# Patient Record
(demographics unavailable — no encounter records)

---

## 2024-10-23 NOTE — HISTORY OF PRESENT ILLNESS
[FreeTextEntry1] : SUZETTE MORILLO  is being evaluated at the request of Dr. Veloz for an opinion re: intermittent  globus type sensation for the past year. Styates that has sensation of food getting stuck in oropharynx @ 1-2 times months. Sandwiches seem to be the most problematic.  Addtionally give s h/o constipation on Wegovy and a h/o diverticulitis ~  6 years ago. Has never had a colonoscopy  Denies melena, hematemersis, BRBPR, unexpected weight loss

## 2024-10-23 NOTE — CONSULT LETTER
[Dear  ___] : Dear  [unfilled], [Consult Letter:] : I had the pleasure of evaluating your patient, [unfilled]. [Please see my note below.] : Please see my note below. [Consult Closing:] : Thank you very much for allowing me to participate in the care of this patient.  If you have any questions, please do not hesitate to contact me. [Sincerely,] : Sincerely, [FreeTextEntry3] : Sotero Mathew MD tel: [phone] fax: [phone]

## 2024-10-23 NOTE — HISTORY OF PRESENT ILLNESS
[FreeTextEntry1] : SUZETTE MORILLO  is being evaluated at the request of Dr. Vleoz for an opinion re: intermittent  globus type sensation for the past year. Styates that has sensation of food getting stuck in oropharynx @ 1-2 times months. Sandwiches seem to be the most problematic.  Addtionally give s h/o constipation on Wegovy and a h/o diverticulitis ~  6 years ago. Has never had a colonoscopy  Denies melena, hematemersis, BRBPR, unexpected weight loss

## 2024-10-23 NOTE — CONSULT LETTER
[Dear  ___] : Dear  [unfilled], [Consult Letter:] : I had the pleasure of evaluating your patient, [unfilled]. [Please see my note below.] : Please see my note below. [Consult Closing:] : Thank you very much for allowing me to participate in the care of this patient.  If you have any questions, please do not hesitate to contact me. [Sincerely,] : Sincerely, [FreeTextEntry3] : Sotero Mathew MD tel: [phone] fax: [phone]

## 2024-10-23 NOTE — ASSESSMENT
[FreeTextEntry1] : 1. ? LPRT:  Trial Pantoprazole.  Dietary and lifestyle modiiations. EGD planned  2. Constipation on Wegovy..increase water / prn Miralax  3. History odf Diverticulitis:  Colonoscopy planned  Pertinent available records reviewed Risks of the procedures including but not limited to bleeding / perforation / infection / anesthesia complication / missed  lesions explained to the  patient . The patient expressed understanding and a desire to proceed with the procedures.  Risk of not doing procedures includes but is not limited to missed or delayed diagnosis of gastric pathology, colon cancer or other gastrointestinal pathology  A consultation note was provided to the referring provider

## 2024-10-23 NOTE — PHYSICAL EXAM
[Alert] : alert [Sclera] : the sclera and conjunctiva were normal [Normal Appearance] : the appearance of the neck was normal [None] : no edema [Abdomen Soft] : soft [No CVA Tenderness] : no CVA  tenderness [Abnormal Walk] : normal gait [Normal Color / Pigmentation] : normal skin color and pigmentation [No Focal Deficits] : no focal deficits [Oriented To Time, Place, And Person] : oriented to person, place, and time [de-identified] : Deferred pending colonoscopy

## 2024-10-23 NOTE — PHYSICAL EXAM
[Alert] : alert [Sclera] : the sclera and conjunctiva were normal [Normal Appearance] : the appearance of the neck was normal [None] : no edema [Abdomen Soft] : soft [No CVA Tenderness] : no CVA  tenderness [Abnormal Walk] : normal gait [Normal Color / Pigmentation] : normal skin color and pigmentation [No Focal Deficits] : no focal deficits [Oriented To Time, Place, And Person] : oriented to person, place, and time [de-identified] : Deferred pending colonoscopy

## 2025-01-30 NOTE — HISTORY OF PRESENT ILLNESS
[FreeTextEntry1] : est care [de-identified] : Patient is a 41 year old F here to discuss and acute concern and establish care:  Upper back pain  x1.5 weeks started after sleeping poorly and then in a rigid position while in the car unable to sleep well for the past several nights pain alleviates a bit with ibuprofen 600mg up to BID but not completely  tried heat which worsened the pain took 's flexeril which did not help at all either  does also incidentally note change in urination (darker, change in odor) for the past several days with recent ureteral issues within the past 3 months (3) adequate

## 2025-01-30 NOTE — HEALTH RISK ASSESSMENT
[Good] : ~his/her~  mood as  good [Yes] : Yes [Monthly or less (1 pt)] : Monthly or less (1 point) [1 or 2 (0 pts)] : 1 or 2 (0 points) [Never (0 pts)] : Never (0 points) [No] : In the past 12 months have you used drugs other than those required for medical reasons? No [One fall no injury in past year] : Patient reported one fall in the past year without injury [0] : 2) Feeling down, depressed, or hopeless: Not at all (0) [Patient reported mammogram was normal] : Patient reported mammogram was normal [None] : None [With Family] : lives with family [Employed] : employed [] :  [# Of Children ___] : has [unfilled] children [Sexually Active] : sexually active [Feels Safe at Home] : Feels safe at home [Fully functional (bathing, dressing, toileting, transferring, walking, feeding)] : Fully functional (bathing, dressing, toileting, transferring, walking, feeding) [Fully functional (using the telephone, shopping, preparing meals, housekeeping, doing laundry, using] : Fully functional and needs no help or supervision to perform IADLs (using the telephone, shopping, preparing meals, housekeeping, doing laundry, using transportation, managing medications and managing finances) [Smoke Detector] : smoke detector [Safety elements used in home] : safety elements used in home [Seat Belt] :  uses seat belt [Never] : Never [PHQ-2 Negative - No further assessment needed] : PHQ-2 Negative - No further assessment needed [[id number]] : 0 [de-identified] : fell on knee [Change in mental status noted] : No change in mental status noted [Language] : denies difficulty with language [Handling Complex Tasks] : denies difficulty handling complex tasks [Reports changes in hearing] : Reports no changes in hearing [Reports changes in vision] : Reports no changes in vision [Reports changes in dental health] : Reports no changes in dental health [TB Exposure] : is not being exposed to tuberculosis [PapSmearComments] : had hysterectomy [MammogramDate] : 2024

## 2025-01-30 NOTE — HEALTH RISK ASSESSMENT
[Good] : ~his/her~  mood as  good [Yes] : Yes [Monthly or less (1 pt)] : Monthly or less (1 point) [1 or 2 (0 pts)] : 1 or 2 (0 points) [Never (0 pts)] : Never (0 points) [No] : In the past 12 months have you used drugs other than those required for medical reasons? No [One fall no injury in past year] : Patient reported one fall in the past year without injury [0] : 2) Feeling down, depressed, or hopeless: Not at all (0) [Patient reported mammogram was normal] : Patient reported mammogram was normal [None] : None [With Family] : lives with family [Employed] : employed [] :  [# Of Children ___] : has [unfilled] children [Sexually Active] : sexually active [Feels Safe at Home] : Feels safe at home [Fully functional (bathing, dressing, toileting, transferring, walking, feeding)] : Fully functional (bathing, dressing, toileting, transferring, walking, feeding) [Fully functional (using the telephone, shopping, preparing meals, housekeeping, doing laundry, using] : Fully functional and needs no help or supervision to perform IADLs (using the telephone, shopping, preparing meals, housekeeping, doing laundry, using transportation, managing medications and managing finances) [Smoke Detector] : smoke detector [Safety elements used in home] : safety elements used in home [Seat Belt] :  uses seat belt [Never] : Never [PHQ-2 Negative - No further assessment needed] : PHQ-2 Negative - No further assessment needed [[id number]] : 0 [de-identified] : fell on knee [Change in mental status noted] : No change in mental status noted [Language] : denies difficulty with language [Handling Complex Tasks] : denies difficulty handling complex tasks [Reports changes in hearing] : Reports no changes in hearing [Reports changes in vision] : Reports no changes in vision [Reports changes in dental health] : Reports no changes in dental health [TB Exposure] : is not being exposed to tuberculosis [MammogramDate] : 2024 [PapSmearComments] : had hysterectomy

## 2025-01-30 NOTE — PLAN
[FreeTextEntry1] : Upper Back Pain  Acute x1.5 weeks with history of scoliosis. Mildly improvement with ibuprofen 600mg. No improvement with Flexeril and worsens with heat Given new onset urinary changes, despite negative CVA test, will also do a urine test and BMP to ensure no MARYAN or UTI that is causing this pain. No neuropathic pain at this time so likely MSK related. plan - BMP for kidney function -> if normal, will send in tizanidine for pain control  - recommend lidocaine patches, ice packs on the affected region  - c/w ibuprofen at this time; can add on scheduled tylenol 1000mg TID   Change in Urine Odor and color  Acute x3 days. POCT UA in office today unremarkable  plan - UA with reflex culture

## 2025-01-30 NOTE — HISTORY OF PRESENT ILLNESS
[FreeTextEntry1] : est care [de-identified] : Patient is a 41 year old F here to discuss and acute concern and establish care:  Upper back pain  x1.5 weeks started after sleeping poorly and then in a rigid position while in the car unable to sleep well for the past several nights pain alleviates a bit with ibuprofen 600mg up to BID but not completely  tried heat which worsened the pain took 's flexeril which did not help at all either  does also incidentally note change in urination (darker, change in odor) for the past several days with recent ureteral issues within the past 3 months

## 2025-01-30 NOTE — PHYSICAL EXAM
[Normal Sclera/Conjunctiva] : normal sclera/conjunctiva [Normal Outer Ear/Nose] : the outer ears and nose were normal in appearance [No Respiratory Distress] : no respiratory distress  [No Accessory Muscle Use] : no accessory muscle use [Normal] : affect was normal and insight and judgment were intact [Soft] : abdomen soft [Non Tender] : non-tender [Non-distended] : non-distended